# Patient Record
Sex: FEMALE | Race: WHITE
[De-identification: names, ages, dates, MRNs, and addresses within clinical notes are randomized per-mention and may not be internally consistent; named-entity substitution may affect disease eponyms.]

---

## 2018-06-15 ENCOUNTER — HOSPITAL ENCOUNTER (EMERGENCY)
Dept: HOSPITAL 63 - ER | Age: 28
Discharge: HOME | End: 2018-06-15
Payer: COMMERCIAL

## 2018-06-15 VITALS — HEIGHT: 64 IN | WEIGHT: 171 LBS | BODY MASS INDEX: 29.19 KG/M2

## 2018-06-15 VITALS — DIASTOLIC BLOOD PRESSURE: 80 MMHG | SYSTOLIC BLOOD PRESSURE: 120 MMHG
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
## 2018-06-15 DIAGNOSIS — S61.210A: Primary | ICD-10-CM
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
## 2018-06-15 PROCEDURE — 90714 TD VACC NO PRESV 7 YRS+ IM: CPT

## 2018-06-15 PROCEDURE — 12001 RPR S/N/AX/GEN/TRNK 2.5CM/<: CPT

## 2018-06-15 PROCEDURE — 90471 IMMUNIZATION ADMIN: CPT

## 2018-06-15 NOTE — ED.ADGEN
Adult General


Chief Complaint


Chief Complaint


"  I was cutting up chicken.. and I went to clean the knife blade.. and cut the 

end of my finger ( Rt - index- finger pad 2 cm)





HPI


HPI





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 

to Rt. index finger  cutting  up chicken.  Patient distal neurovascular intact. 

Patient does not remember last tetanus. Patient is normally healthy. Discussed 

TREATMENT  options, patient has elected to have suture repair.   Patient denies 

any immunosuppression. Patient normally healthy. Patient follows at [facility].





Review of Systems


Review of Systems





Constitutional: Denies fever or chills []


Eyes: Denies change in visual acuity, redness, or eye pain []


HENT: Denies nasal congestion or sore throat []


Respiratory: Denies cough or shortness of breath []


Cardiovascular: No additional information not addressed in HPI []


GI: Denies abdominal pain, nausea, vomiting, bloody stools or diarrhea []


: Denies dysuria or hematuria []


Musculoskeletal: Denies back pain or joint pain []


Integument: Denies rash or skin lesions []complains  of right index finger 

laceration


Neurologic: Denies headache, focal weakness or sensory changes []


Endocrine: Denies polyuria or polydipsia []





All other systems were reviewed and found to be within normal limits, except as 

documented in this note.





Family History


Family History


Noncontributory





Current Medications


Current Medications





Current Medications








 Medications


  (Trade)  Dose


 Ordered  Sig/Jeffery  Start Time


 Stop Time Status Last Admin


Dose Admin


 


 Bupivacaine HCl


  (Sensorcaine Mpf


 0.5%)  30 ml  1X  ONCE  6/15/18 19:00


 6/15/18 19:14 DC  





 


 Lidocaine HCl  20 ml  1X  ONCE  6/15/18 19:30


 6/15/18 19:31 DC  





 


 Tetanus/


 Diphtheria


 Toxoids Adsorbed


  (Tenivac Vial)  0.5 ml  ONCE ONCE  6/15/18 19:30


 6/15/18 19:31 DC 6/15/18 19:34


0.5 ML











Allergies


Allergies





Allergies








Coded Allergies Type Severity Reaction Last Updated Verified


 


  No Known Drug Allergies    6/15/18 No











Physical Exam


Physical Exam





Constitutional: Well developed, well nourished, moderately acute distress, non-

toxic appearance. []


HENT: Normocephalic, atraumatic, bilateral external ears normal, oropharynx 

moist, no oral exudates, nose normal. []


Eyes: PERRLA, EOMI, conjunctiva normal, no discharge. [] 


Neck: Normal range of motion, no tenderness, supple, no stridor. [] 


Cardiovascular:Heart rate regular rhythm, no murmur []


Lungs & Thorax:  Bilateral breath sounds clear to auscultation []


Abdomen: Bowel sounds normal, soft, no tenderness, no masses, no pulsatile 

masses. [] 


Skin: Warm, dry, no erythema, no rash. [] Laceration right index finger pad as 

per history of present illness


Back: No tenderness, no CVA tenderness. [] 


Extremities: No tenderness, no cyanosis, no clubbing, ROM intact, no edema. [] 


Neurologic: Alert and oriented X 3, normal motor function, normal sensory 

function, no focal deficits noted. []


Psychologic: Affect anxious, judgement normal, mood normal. []





Current Patient Data


Vital Signs





 Vital Signs








  Date Time  Temp Pulse Resp B/P (MAP) Pulse Ox O2 Delivery O2 Flow Rate FiO2


 


6/15/18 19:53  62 18 120/80 (93) 98 Room Air  


 


6/15/18 18:55 98.1       











EKG


EKG


[]





Radiology/Procedures


Radiology/Procedures


[]





Course & Med Decision Making


Course & Med Decision Making


Pertinent Labs and Imaging studies reviewed. (See chart for details)





Suture note-finger cleaned with surgical soap and water. Betadine applied to 

edge of laceration. Injected edge laceration of Sensorcaine and lidocaine. 

Patient also received a digital block. Finger re-irrigated in range of motion. 

Patient received 6-x4-0 Prolene sutures.  Dressing applied. Patient to keep the 

finger clean and dry. Polysporin 4 times a day. Patient wear finger cot while 

working with [employer].    Sutures out in 10 days. Return if any concerns. Tylenol 

and ibuprofen for pain.





[]





Final Impression


Final Impression


1. Laceration Rt. Index-finger pad 2 cm[]





Dragon Disclaimer


Dragon Disclaimer


This electronic medical record was generated, in whole or in part, using a 

voice recognition dictation system.











TATIANA VALLEJO MD Dimas 15, 2018 18:58